# Patient Record
Sex: FEMALE | Race: WHITE | HISPANIC OR LATINO | URBAN - METROPOLITAN AREA
[De-identification: names, ages, dates, MRNs, and addresses within clinical notes are randomized per-mention and may not be internally consistent; named-entity substitution may affect disease eponyms.]

---

## 2017-04-28 ENCOUNTER — EMERGENCY (EMERGENCY)
Facility: HOSPITAL | Age: 37
LOS: 1 days | Discharge: HOME | End: 2017-04-28
Attending: EMERGENCY MEDICINE | Admitting: EMERGENCY MEDICINE
Payer: COMMERCIAL

## 2017-04-28 VITALS
HEART RATE: 95 BPM | DIASTOLIC BLOOD PRESSURE: 89 MMHG | OXYGEN SATURATION: 98 % | RESPIRATION RATE: 18 BRPM | SYSTOLIC BLOOD PRESSURE: 129 MMHG | TEMPERATURE: 99 F

## 2017-04-28 VITALS
OXYGEN SATURATION: 100 % | HEIGHT: 65 IN | TEMPERATURE: 98 F | WEIGHT: 145.06 LBS | SYSTOLIC BLOOD PRESSURE: 134 MMHG | DIASTOLIC BLOOD PRESSURE: 60 MMHG | RESPIRATION RATE: 18 BRPM | HEART RATE: 90 BPM

## 2017-04-28 PROCEDURE — 99283 EMERGENCY DEPT VISIT LOW MDM: CPT | Mod: 25

## 2017-04-28 PROCEDURE — 99283 EMERGENCY DEPT VISIT LOW MDM: CPT

## 2017-04-28 PROCEDURE — T1013: CPT

## 2017-04-28 PROCEDURE — 73562 X-RAY EXAM OF KNEE 3: CPT | Mod: 26,RT

## 2017-04-28 PROCEDURE — 73562 X-RAY EXAM OF KNEE 3: CPT

## 2017-04-28 RX ORDER — ACETAMINOPHEN 500 MG
650 TABLET ORAL ONCE
Qty: 0 | Refills: 0 | Status: COMPLETED | OUTPATIENT
Start: 2017-04-28 | End: 2017-04-28

## 2017-04-28 RX ADMIN — Medication 650 MILLIGRAM(S): at 17:51
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
## 2017-04-28 NOTE — ED ADULT NURSE NOTE - DISCHARGE TEACHING
With , patient understands discharge instructions and symptoms to return to ED as provided on discharge paperwork

## 2017-08-09 PROBLEM — Z00.00 ENCOUNTER FOR PREVENTIVE HEALTH EXAMINATION: Status: ACTIVE | Noted: 2017-08-09

## 2017-10-05 ENCOUNTER — APPOINTMENT (OUTPATIENT)
Dept: NEUROLOGY | Facility: CLINIC | Age: 37
End: 2017-10-05

## 2018-08-07 ENCOUNTER — EMERGENCY (EMERGENCY)
Facility: HOSPITAL | Age: 38
LOS: 1 days | Discharge: HOME | End: 2018-08-07
Attending: EMERGENCY MEDICINE
Payer: COMMERCIAL

## 2018-08-07 VITALS
SYSTOLIC BLOOD PRESSURE: 147 MMHG | TEMPERATURE: 98 F | RESPIRATION RATE: 26 BRPM | WEIGHT: 125 LBS | HEIGHT: 60 IN | OXYGEN SATURATION: 100 % | HEART RATE: 93 BPM | DIASTOLIC BLOOD PRESSURE: 102 MMHG

## 2018-08-07 VITALS
HEART RATE: 76 BPM | TEMPERATURE: 98 F | RESPIRATION RATE: 16 BRPM | OXYGEN SATURATION: 100 % | DIASTOLIC BLOOD PRESSURE: 77 MMHG | SYSTOLIC BLOOD PRESSURE: 111 MMHG

## 2018-08-07 LAB
ALBUMIN SERPL ELPH-MCNC: 4.3 G/DL — SIGNIFICANT CHANGE UP (ref 3.3–5.2)
ALP SERPL-CCNC: 71 U/L — SIGNIFICANT CHANGE UP (ref 40–120)
ALT FLD-CCNC: 13 U/L — SIGNIFICANT CHANGE UP
ANION GAP SERPL CALC-SCNC: 13 MMOL/L — SIGNIFICANT CHANGE UP (ref 5–17)
APTT BLD: 28.7 SEC — SIGNIFICANT CHANGE UP (ref 27.5–37.4)
AST SERPL-CCNC: 19 U/L — SIGNIFICANT CHANGE UP
BASOPHILS # BLD AUTO: 0 K/UL — SIGNIFICANT CHANGE UP (ref 0–0.2)
BASOPHILS NFR BLD AUTO: 0.4 % — SIGNIFICANT CHANGE UP (ref 0–2)
BILIRUB SERPL-MCNC: 0.5 MG/DL — SIGNIFICANT CHANGE UP (ref 0.4–2)
BUN SERPL-MCNC: 11 MG/DL — SIGNIFICANT CHANGE UP (ref 8–20)
CALCIUM SERPL-MCNC: 9.3 MG/DL — SIGNIFICANT CHANGE UP (ref 8.6–10.2)
CHLORIDE SERPL-SCNC: 104 MMOL/L — SIGNIFICANT CHANGE UP (ref 98–107)
CO2 SERPL-SCNC: 23 MMOL/L — SIGNIFICANT CHANGE UP (ref 22–29)
CREAT SERPL-MCNC: 0.65 MG/DL — SIGNIFICANT CHANGE UP (ref 0.5–1.3)
EOSINOPHIL # BLD AUTO: 0.3 K/UL — SIGNIFICANT CHANGE UP (ref 0–0.5)
EOSINOPHIL NFR BLD AUTO: 3.8 % — SIGNIFICANT CHANGE UP (ref 0–6)
GLUCOSE SERPL-MCNC: 94 MG/DL — SIGNIFICANT CHANGE UP (ref 70–115)
HCG SERPL-ACNC: <5 MIU/ML — SIGNIFICANT CHANGE UP
HCT VFR BLD CALC: 37.5 % — SIGNIFICANT CHANGE UP (ref 37–47)
HGB BLD-MCNC: 12.4 G/DL — SIGNIFICANT CHANGE UP (ref 12–16)
INR BLD: 0.96 RATIO — SIGNIFICANT CHANGE UP (ref 0.88–1.16)
LIDOCAIN IGE QN: 22 U/L — SIGNIFICANT CHANGE UP (ref 22–51)
LYMPHOCYTES # BLD AUTO: 1.8 K/UL — SIGNIFICANT CHANGE UP (ref 1–4.8)
LYMPHOCYTES # BLD AUTO: 24.8 % — SIGNIFICANT CHANGE UP (ref 20–55)
MCHC RBC-ENTMCNC: 27.7 PG — SIGNIFICANT CHANGE UP (ref 27–31)
MCHC RBC-ENTMCNC: 33.1 G/DL — SIGNIFICANT CHANGE UP (ref 32–36)
MCV RBC AUTO: 83.9 FL — SIGNIFICANT CHANGE UP (ref 81–99)
MONOCYTES # BLD AUTO: 0.5 K/UL — SIGNIFICANT CHANGE UP (ref 0–0.8)
MONOCYTES NFR BLD AUTO: 7.1 % — SIGNIFICANT CHANGE UP (ref 3–10)
NEUTROPHILS # BLD AUTO: 4.5 K/UL — SIGNIFICANT CHANGE UP (ref 1.8–8)
NEUTROPHILS NFR BLD AUTO: 63.6 % — SIGNIFICANT CHANGE UP (ref 37–73)
PLATELET # BLD AUTO: 244 K/UL — SIGNIFICANT CHANGE UP (ref 150–400)
POTASSIUM SERPL-MCNC: 3.5 MMOL/L — SIGNIFICANT CHANGE UP (ref 3.5–5.3)
POTASSIUM SERPL-SCNC: 3.5 MMOL/L — SIGNIFICANT CHANGE UP (ref 3.5–5.3)
PROT SERPL-MCNC: 6.8 G/DL — SIGNIFICANT CHANGE UP (ref 6.6–8.7)
PROTHROM AB SERPL-ACNC: 10.6 SEC — SIGNIFICANT CHANGE UP (ref 9.8–12.7)
RBC # BLD: 4.47 M/UL — SIGNIFICANT CHANGE UP (ref 4.4–5.2)
RBC # FLD: 12.7 % — SIGNIFICANT CHANGE UP (ref 11–15.6)
SODIUM SERPL-SCNC: 140 MMOL/L — SIGNIFICANT CHANGE UP (ref 135–145)
WBC # BLD: 7.1 K/UL — SIGNIFICANT CHANGE UP (ref 4.8–10.8)
WBC # FLD AUTO: 7.1 K/UL — SIGNIFICANT CHANGE UP (ref 4.8–10.8)

## 2018-08-07 PROCEDURE — 71260 CT THORAX DX C+: CPT

## 2018-08-07 PROCEDURE — 85730 THROMBOPLASTIN TIME PARTIAL: CPT

## 2018-08-07 PROCEDURE — 70450 CT HEAD/BRAIN W/O DYE: CPT

## 2018-08-07 PROCEDURE — 73110 X-RAY EXAM OF WRIST: CPT | Mod: 26,LT

## 2018-08-07 PROCEDURE — 36415 COLL VENOUS BLD VENIPUNCTURE: CPT

## 2018-08-07 PROCEDURE — 74177 CT ABD & PELVIS W/CONTRAST: CPT | Mod: 26

## 2018-08-07 PROCEDURE — 85610 PROTHROMBIN TIME: CPT

## 2018-08-07 PROCEDURE — 84702 CHORIONIC GONADOTROPIN TEST: CPT

## 2018-08-07 PROCEDURE — 72125 CT NECK SPINE W/O DYE: CPT

## 2018-08-07 PROCEDURE — 71260 CT THORAX DX C+: CPT | Mod: 26

## 2018-08-07 PROCEDURE — 71045 X-RAY EXAM CHEST 1 VIEW: CPT | Mod: 26

## 2018-08-07 PROCEDURE — 74177 CT ABD & PELVIS W/CONTRAST: CPT

## 2018-08-07 PROCEDURE — 99053 MED SERV 10PM-8AM 24 HR FAC: CPT

## 2018-08-07 PROCEDURE — 99285 EMERGENCY DEPT VISIT HI MDM: CPT | Mod: 25

## 2018-08-07 PROCEDURE — 71045 X-RAY EXAM CHEST 1 VIEW: CPT

## 2018-08-07 PROCEDURE — 70450 CT HEAD/BRAIN W/O DYE: CPT | Mod: 26

## 2018-08-07 PROCEDURE — 83690 ASSAY OF LIPASE: CPT

## 2018-08-07 PROCEDURE — 72125 CT NECK SPINE W/O DYE: CPT | Mod: 26

## 2018-08-07 PROCEDURE — T1013: CPT

## 2018-08-07 PROCEDURE — 85027 COMPLETE CBC AUTOMATED: CPT

## 2018-08-07 PROCEDURE — 99284 EMERGENCY DEPT VISIT MOD MDM: CPT | Mod: 25

## 2018-08-07 PROCEDURE — 80053 COMPREHEN METABOLIC PANEL: CPT

## 2018-08-07 PROCEDURE — 73110 X-RAY EXAM OF WRIST: CPT

## 2018-08-07 PROCEDURE — 96374 THER/PROPH/DIAG INJ IV PUSH: CPT | Mod: XU

## 2018-08-07 RX ORDER — SODIUM CHLORIDE 9 MG/ML
3 INJECTION INTRAMUSCULAR; INTRAVENOUS; SUBCUTANEOUS EVERY 8 HOURS
Qty: 0 | Refills: 0 | Status: DISCONTINUED | OUTPATIENT
Start: 2018-08-07 | End: 2018-08-12

## 2018-08-07 RX ORDER — MORPHINE SULFATE 50 MG/1
2 CAPSULE, EXTENDED RELEASE ORAL ONCE
Qty: 0 | Refills: 0 | Status: DISCONTINUED | OUTPATIENT
Start: 2018-08-07 | End: 2018-08-07

## 2018-08-07 RX ADMIN — MORPHINE SULFATE 2 MILLIGRAM(S): 50 CAPSULE, EXTENDED RELEASE ORAL at 06:58
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
## 2018-08-07 NOTE — ED ADULT NURSE NOTE - NSIMPLEMENTINTERV_GEN_ALL_ED
Implemented All Fall Risk Interventions:  [place] to call system. Call bell, personal items and telephone within reach. Instruct patient to call for assistance. Room bathroom lighting operational. Non-slip footwear when patient is off stretcher. Physically safe environment: no spills, clutter or unnecessary equipment. Stretcher in lowest position, wheels locked, appropriate side rails in place. Provide visual cue, wrist band, yellow gown, etc. Monitor gait and stability. Monitor for mental status changes and reorient to person, place, and time. Review medications for side effects contributing to fall risk. Reinforce activity limits and safety measures with patient and family.

## 2018-08-07 NOTE — ED ADULT NURSE REASSESSMENT NOTE - NS ED NURSE REASSESS COMMENT FT1
patient to be discharged. patient complaining of left hand pain. MD. Alvarez made aware that patient was having left hand pain. x-ray ordered.

## 2019-12-17 NOTE — ED ADULT TRIAGE NOTE - TEMPERATURE IN CELSIUS (DEGREES C)
As GOC were addressed with this patient, Palliative signing off at this point. MOLST form filled and placed in chart. Please reconsult if necessary. D/w Dr. Kapoor.    Adeline Huggins  PGY-2 Internal Medicine  Pager: [phone] (NS)/[phone] (LIZURI) [phone]

## 2020-02-12 NOTE — ED PROVIDER NOTE - FAMILY HISTORY
Normal rate, regular rhythm.  Heart sounds S1, S2.  No murmurs, rubs or gallops. No pertinent family history in first degree relatives

## 2020-11-05 NOTE — ED ADULT TRIAGE NOTE - HEIGHT IN INCHES
Physical Therapy Daily Treatment Note    Date:  2020    Patient Name:  Jose Pope    :  1967  MRN: [id number]  Restrictions/Precautions:    Medical/Treatment Diagnosis Information:  · Diagnosis: Lumbar DDD  Insurance/Certification information:  PT Insurance Information: [employer] - no stim, no ionto, 30 visits per year  Physician Information:  Referring Practitioner: Dai Jarrell CNP  Plan of care signed (Y/N):  N  Visit# / total visits:  6/10     G-Code (if applicable): Modified Oswestry 74% (60% at eval)    Medicare Cap (if applicable):  n/a = total amount used, updated 2020    Time in:  1:15      Timed Treatment: 15 Total Treatment Time:  15  Time out: 1:30  ________________________________________________________________________________________    Pain Level:    /10  SUBJECTIVE:  Pt reports that she feels the the pool is helpful - helps to stretch out her muscles. Feels improved pain for about a day afterwards. Received an injection in her R knee about a week ago, and that has been very helpful.      OBJECTIVE:     Exercise/Equipment Resistance/Repetitions Other comments          LTR   10x B HEP   TA set  10x5\"  HEP   GS 10x5\" HEP                                                                                                                 Other Therapeutic Activities:      Manual Treatments:         Modalities:      Test/Measurements:      Observation/Palpation  Posture: Poor  Observation: gait - pt ambulated with decreased L hip extension, B Trendelenburg, decreased R arm swing, forward flexed and no trunk rotation present, femoral ambulator  Body Mechanics: SLS - LLE weight shift and Trendelenburg noted B, unsteady on R LE; unable to DL squat due to pain in knees     AROM RLE (degrees)  RLE General AROM: hip flexion 90 degrees, hip IR/ER 15 degrees, knee flexion 100, knee extension lacking 10 degrees  AROM LLE (degrees)  LLE General AROM: hip flexion 90 degrees, hip IR/ER 15 degrees, knee flexion 115, knee extension lacking 10 degrees  Spine  Lumbar: flexion WFL, SB R decreased 50%, L decreased 75%, extension decreased 50% with pain during movement  Special Tests: (-) SLR and slump B, (-) Babinski, (-) clonus, 1+ reflexes throughout LE     Strength RLE  Strength RLE: Exception  R Hip Flexion: 4-/5 (3-/5 at eval)  R Hip Extension: 4-/5 (3-/5 at eval)  R Hip ABduction: 4-/5 (3+/5 at eval)  R Hip External Rotation: 4-/5 (3+/5 at eval)  R Knee Flexion: 4/5 (4-/5 at eval)  R Knee Extension: 4/5  R Ankle Dorsiflexion: 4+/5  Strength LLE  L Hip Flexion: 4-/5 (3-/5 at eval)  L Hip Extension: 4-/5 (3-/5 at eval)  L Hip ABduction: 4-/5  L Knee Flexion: 4/5  L Knee Extension: 4/5  L Ankle Dorsiflexion: 4+/5  Strength Other  Other: prone glute activation - fair (+) RLE, poor (+) LLE  Sensation  Overall Sensation Status: WNL(to light touch)       ASSESSMENT:  After 6 aquatic PT sessions, pt is demonstrating mild improvement in lower extremity strength and mild improvement in gait. However, her modified Oswestry actually declined. Recommend continued plan of care in aquatic setting.      Treatment/Activity Tolerance:   [x]Patient tolerated treatment well [] Patient limited by fatique  []Patient limited by pain [] Patient limited by other medical complications  [] Other:     Goals:          Long term goals  Time Frame for Long term goals : 5 weeks  Long term goal 1: Pt will be independent with HEP - met  Long term goal 2: Pt will tolerate 30 minutes aquatic PT - met  Long term goal 3: Pt will increase B hip strength to grossly 4/5 - improving, see above  Long term goal 4: Pt will improve Modified Oswestry by 7 points to indicate significant change - not met   Long term goal 5: Pt will ambulate with increased hip extension and arm swing B - improving     Plan: [x] Continue per plan of care [] Alter current plan (see comments)   [] Plan of care initiated [] Hold pending MD visit [] 5
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
## 2024-01-05 NOTE — ED ADULT NURSE NOTE - RESPIRATORY ASSESSMENT
Is This A New Presentation, Or A Follow-Up?: Skin Lesion What Type Of Note Output Would You Prefer (Optional)?: Standard Output How Severe Is Your Skin Lesion?: mild Has Your Skin Lesion Been Treated?: not been treated WDL
